# Patient Record
Sex: MALE | Race: OTHER | Employment: UNEMPLOYED | URBAN - METROPOLITAN AREA
[De-identification: names, ages, dates, MRNs, and addresses within clinical notes are randomized per-mention and may not be internally consistent; named-entity substitution may affect disease eponyms.]

---

## 2023-06-21 ENCOUNTER — HOSPITAL ENCOUNTER (OUTPATIENT)
Dept: PHYSICAL THERAPY | Age: 58
Setting detail: THERAPIES SERIES
End: 2023-06-21
Payer: MEDICARE

## 2023-06-28 ENCOUNTER — HOSPITAL ENCOUNTER (OUTPATIENT)
Dept: PHYSICAL THERAPY | Age: 58
Setting detail: THERAPIES SERIES
Discharge: HOME | End: 2023-06-28
Payer: MEDICARE

## 2023-06-28 PROCEDURE — 97162 PT EVAL MOD COMPLEX 30 MIN: CPT

## 2023-06-28 ASSESSMENT — PAIN DESCRIPTION - ORIENTATION: ORIENTATION: RIGHT

## 2023-06-28 ASSESSMENT — PAIN SCALES - GENERAL: PAINLEVEL_OUTOF10: 5

## 2023-06-28 ASSESSMENT — PAIN DESCRIPTION - LOCATION: LOCATION: LEG

## 2023-07-07 ENCOUNTER — HOSPITAL ENCOUNTER (OUTPATIENT)
Dept: PHYSICAL THERAPY | Age: 58
Setting detail: THERAPIES SERIES
Discharge: HOME | End: 2023-07-07
Payer: MEDICARE

## 2023-07-07 PROCEDURE — 97110 THERAPEUTIC EXERCISES: CPT

## 2023-07-07 ASSESSMENT — PAIN SCALES - GENERAL: PAINLEVEL_OUTOF10: 4

## 2023-07-07 ASSESSMENT — PAIN DESCRIPTION - DESCRIPTORS: DESCRIPTORS: DULL;ACHING;THROBBING

## 2023-07-07 ASSESSMENT — PAIN DESCRIPTION - ORIENTATION: ORIENTATION: RIGHT

## 2023-07-07 ASSESSMENT — PAIN DESCRIPTION - LOCATION: LOCATION: LEG

## 2023-07-07 NOTE — PROGRESS NOTES
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, right femur, initial encounter for fracture [M84.551A]  Other specified personal risk factors, not elsewhere classified [Z91.89]      Treatment Diagnosis: Rt leg pain    Visit Information:  Insurance: Payor: MEDICARE / Plan: MEDICARE PART A AND B / Product Type: *No Product type* /   PT Visit Information  PT Insurance Information: Medicare  Total # of Visits to Date: 2  Plan of Care/Certification Expiration Date: 23  No Show: 0  Progress Note Due Date: 23  Canceled Appointment: 0  Progress Note Counter:     Subjective Information:  Subjective: Pt reported he has been completing his HEP and has noticed a difference in pain levels. Pt noted stairs are still difficult for him to complete. HEP Compliance:  [x] Good [] Fair [] Poor [] Reports not doing due to:               Pain Screening  Patient Currently in Pain: Yes  Pain Assessment: 0-10  Pain Level: 4  Pain Location: Leg  Pain Orientation: Right  Pain Descriptors: Dull, Aching, Throbbing    Treatment:  Exercises:  Exercises  Exercise 1: Bike L1 x5 min  Exercise 2: Heel slides with strap 3\" hold x15  Exercise 3: QS 5\" hold x15  Exercise 4: SAQs 3\" hold x 15  Exercise 5: 3 way SLR x10  Exercise 6: Bridges x10  Exercise 7: Step ups 6\" F/L x10  Exercise 8: Rockerboard 3-way x 15  Exercise 20: HEP: QS, hams str + heel slides         *Indicates exercise, modality, or manual techniques to be initiated when appropriate      Assessment:    Body Structures, Functions, Activity Limitations Requiring Skilled Therapeutic Intervention: Decreased functional mobility , Decreased ROM, Decreased strength, Decreased balance, Increased

## 2023-07-12 ENCOUNTER — HOSPITAL ENCOUNTER (OUTPATIENT)
Dept: PHYSICAL THERAPY | Age: 58
Setting detail: THERAPIES SERIES
Discharge: HOME | End: 2023-07-12
Payer: MEDICARE

## 2023-07-12 PROCEDURE — 97110 THERAPEUTIC EXERCISES: CPT

## 2023-07-12 ASSESSMENT — PAIN DESCRIPTION - ORIENTATION: ORIENTATION: RIGHT

## 2023-07-12 ASSESSMENT — PAIN SCALES - GENERAL: PAINLEVEL_OUTOF10: 4

## 2023-07-12 ASSESSMENT — PAIN DESCRIPTION - DESCRIPTORS: DESCRIPTORS: ACHING;SORE

## 2023-07-12 ASSESSMENT — PAIN DESCRIPTION - LOCATION: LOCATION: LEG

## 2023-07-12 NOTE — PROGRESS NOTES
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 Reports completing HEP, states increased soreness after doing exercises. reports he would like to focus more on knee vs hip.   HEP Compliance:  [x] Good [] Fair [] Poor [] Reports not doing due to:    Pain Screening  Patient Currently in Pain: Yes  Pain Level: 4  Pain Location: Leg  Pain Orientation: Right  Pain Descriptors: Aching, Sore    Treatment:  Exercises:  Exercises  Exercise 1: Bike Seat L1 x5 min  Exercise 2: Heel slides with strap 20 reps x 5\" holds  Exercise 3: QS foot on bolster 15 reps x 5\" holds  Exercise 7: Step ups 6\" F/L/R x15, Reps  Exercise 9: hamstring 5 reps x 20\" holds on step  Exercise 10: LAQ 15 reps x 5\" holds x 2# ankle  Exercise 11: prone hamstring curl 15 reps x 2# ankle  Exercise 12: lunge at step 15 reps x 5\" holds  Exercise 20: HEP: continue current + step-ups, LAQ, hamstring curl (administered RTB for home), lunge     *Indicates exercise, modality, or manual techniques to be initiated when appropriate    Objective Measures:     LTG 1 Current Status[de-identified] : -3

## 2023-07-21 ENCOUNTER — HOSPITAL ENCOUNTER (OUTPATIENT)
Dept: PHYSICAL THERAPY | Age: 58
Setting detail: THERAPIES SERIES
Discharge: HOME | End: 2023-07-21
Payer: MEDICARE

## 2023-07-21 PROCEDURE — 97110 THERAPEUTIC EXERCISES: CPT

## 2023-07-21 ASSESSMENT — PAIN DESCRIPTION - ORIENTATION: ORIENTATION: RIGHT

## 2023-07-21 ASSESSMENT — PAIN SCALES - GENERAL: PAINLEVEL_OUTOF10: 5

## 2023-07-21 ASSESSMENT — PAIN DESCRIPTION - DESCRIPTORS: DESCRIPTORS: ACHING;SORE

## 2023-07-21 ASSESSMENT — PAIN DESCRIPTION - LOCATION: LOCATION: LEG

## 2023-07-21 NOTE — PROGRESS NOTES
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, right femur, initial encounter for fracture [M84.551A]  Other specified personal risk factors, not elsewhere classified [Z91.89]      Treatment Diagnosis: Rt leg pain    Visit Information:  Insurance: Payor: MEDICARE / Plan: MEDICARE PART A AND B / Product Type: *No Product type* /   PT Visit Information  PT Insurance Information: Medicare  Total # of Visits to Date: 4  Plan of Care/Certification Expiration Date: 23  No Show: 0  Progress Note Due Date: 23  Canceled Appointment: 0  Progress Note Counter:     Subjective Information:  Subjective: Pt arrived 10 minutes late, however able to accomodate. Pt reported the soreness after exercises has decreased. Pt noted down the steps is difficult and painful.   HEP Compliance:  [x] Good [] Fair [] Poor [] Reports not doing due to:               Pain Screening  Patient Currently in Pain: Yes  Pain Assessment: 0-10  Pain Level: 5  Pain Location: Leg  Pain Orientation: Right  Pain Descriptors: Aching, Sore    Treatment:  Exercises:  Exercises  Exercise 1: Bike Seat L1 x 6 min  Exercise 2: Heel slides with strap 20 reps x 5\" holds  Exercise 3: QS foot on bolster 15 reps x 5\" holds  Exercise 7: Step ups 6\" F/L/R x15, Reps  Exercise 9: hamstring 5 reps x 20\" holds on step  Exercise 10: LAQ 15 reps x 5\" holds x 2# ankle  Exercise 11: prone hamstring curl 15 reps x 2# ankle  Exercise 12: lunge at step 15 reps x 5\" holds  Exercise 20: HEP: continue current + step-ups, LAQ, hamstring curl (administered RTB for home), lunge         *Indicates exercise, modality, or manual techniques to be initiated when appropriate    Objective Measures:

## 2023-07-25 ENCOUNTER — HOSPITAL ENCOUNTER (OUTPATIENT)
Dept: PHYSICAL THERAPY | Age: 58
Setting detail: THERAPIES SERIES
Discharge: HOME | End: 2023-07-25
Payer: MEDICARE

## 2023-07-25 PROCEDURE — 97110 THERAPEUTIC EXERCISES: CPT

## 2023-07-25 ASSESSMENT — PAIN DESCRIPTION - ORIENTATION: ORIENTATION: RIGHT

## 2023-07-25 ASSESSMENT — PAIN SCALES - GENERAL: PAINLEVEL_OUTOF10: 4

## 2023-07-25 ASSESSMENT — PAIN DESCRIPTION - LOCATION: LOCATION: KNEE

## 2023-07-25 NOTE — PLAN OF CARE
[id number] [facility]     Ph: [phone]  Fax: [phone]      [] Certification  [] Recertification []  Plan of Care  [x] Progress Note [] Discharge      Referring Provider: MELCHOR Sandhu - CNP     From:  Keli Pacheco PT  Patient: Savanna Simms (89 y.o. 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 Independent and ongoing In progress, Partially met     Long Term Goals - Time Frame for Long Term Goals : 6 weeks  Goals Current/ Discharge status Status   Long Term Goal 1: Improve Rt knee flexion by >/= 5-10 deg to increase ease with ambulation. AROM RLE (degrees)  R Knee Flexion (0-145): 130  R Knee Extension (0): 1    In progress, Partially met   Long Term Goal 2: Pt will ambulate throughout department with good foot clearance with improved Rt LE WBing and knee flexion S/I. Antalgic gait pattern throughout clinic In progress   Long Term Goal 3: Improve sabra LE strength to >/= 4+/5 to improve stability with standing and walking.  Strength LLE  L Hip Flexion: 5/5  L Hip Extension: 4+/5  L Hip ABduction: 4+/5  L Knee Flexion: 5/5  L Knee Extension: 5/5  L Ankle Dorsiflexion: 5/5  Strength RLE  R Hip Flexion: 4+/5  R Hip Extension: 4+/5  R Hip ABduction: 4+/5  R Knee Flexion: 5/5  R Knee Extension: 5/5  R Ankle

## 2023-07-25 NOTE — PROGRESS NOTES
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 Pt reports falling on Rt knee and Lt leg slipped BWD.   HEP Compliance:  [x] Good [] Fair [] Poor [] Reports not doing due to:    Pain Screening  Patient Currently in Pain: Yes  Pain Assessment: 0-10  Pain Level: 4  Pain Location: Knee  Pain Orientation: Right    Treatment:  Exercises:  Exercises  Exercise 1: Bike Seat L1 x 6 min  Exercise 4: Squats x10  Exercise 5: Gait drills:  with 3 sec hold  Exercise 7: Step downs 4\" x10 sabra  Exercise 9: hamstring str reps x 20\" holds on step  Exercise 12: lunge at step 15 reps x 5\" holds  Exercise 20: HEP: squats, can try step downs but instructed not to complete on a full step     Objective Measures:     Strength: [] NT  [x] MMT completed:  Strength RLE  R Hip Flexion: 4+/5  R Hip Extension: 4+/5  R Hip ABduction: 4+/5  R Knee Flexion: 5/5  R Knee Extension: 5/5  R Ankle Dorsiflexion: 5/5  Strength LLE  L Hip Flexion: 5/5  L Hip Extension: 4+/5  L Hip ABduction: 4+/5  L Knee Flexion: 5/5  L Knee Extension: 5/5  L Ankle Dorsiflexion:

## 2023-09-06 ENCOUNTER — HOSPITAL ENCOUNTER (OUTPATIENT)
Dept: PHYSICAL THERAPY | Age: 58
Setting detail: THERAPIES SERIES
Discharge: HOME | End: 2023-09-06
Payer: MEDICARE

## 2023-09-06 PROCEDURE — 97110 THERAPEUTIC EXERCISES: CPT

## 2023-09-06 ASSESSMENT — PAIN DESCRIPTION - DESCRIPTORS: DESCRIPTORS: DULL

## 2023-09-06 ASSESSMENT — PAIN DESCRIPTION - ORIENTATION: ORIENTATION: RIGHT

## 2023-09-06 ASSESSMENT — PAIN SCALES - GENERAL: PAINLEVEL_OUTOF10: 5

## 2023-09-06 ASSESSMENT — PAIN DESCRIPTION - LOCATION: LOCATION: KNEE

## 2023-09-06 NOTE — PROGRESS NOTES
[id number] [facility]     Ph: [phone]  Fax: [phone]    [] Certification  [] Recertification []  Plan of Care  [x] Progress Note [] Discharge      Referring Provider: MELCHOR Rowe CNP    From:  Waleska Granado DPT     Patient: Leticia Rodriguez (66 y.o. 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 STG 1 Current Status[de-identified] 23: Non compliant last month. Not Met     Long Term Goals - Time Frame for Long Term Goals : 6 weeks  Goals Current/ Discharge status Status   Long Term Goal 1: Improve Rt knee flexion by >/= 5-10 deg to increase ease with ambulation. LTG 1 Current Status[de-identified] 23: Right knee 2-121 degrees supine. Slight antalgic gait. Slightly better than at evaluation. Met   Long Term Goal 2: Pt will ambulate throughout department with good foot clearance with improved Rt LE WBing and knee flexion S/I. LTG 2 Current Status[de-identified] 23: Pt displayed decreased foot clearance, knee flexion, and heel strike with a slight antalgic gait. Slightly better than at evaluation. Partially met   Long Term Goal 3: Improve sabra LE strength to >/= 4+/5 to improve stability with standing and walking. LTG 3 Current Status[de-identified] 23: RLE hip 4+/5 except ext. 4-4+/5, knee & DF 5/5.  LLE 5/5 grossly except Hip ext & abd
S/I. Partially met   LTG 3 Improve sabra LE strength to >/= 4+/5 to improve stability with standing and walking. Partially met   LTG 4 LEFS >/= 20/80 to improve pt's QOL. Met          Plan:  Frequency/Duration:  Plan  Plan Frequency: 2  Plan weeks: 6  Current Treatment Recommendations: Strengthening, ROM, Balance training, Functional mobility training, Transfer training, Gait training, Stair training, Neuromuscular re-education, Manual, Home exercise program, Safety education & training, Patient/Caregiver education & training, Equipment evaluation, education, & procurement, Modalities  Modalities: Heat/Cold  Additional Comments: Placed on hold for RTD. Pt to continue current HEP. See objective section for any therapeutic exercise changes, additions or modifications this date.     Therapy Time:      PT Individual Minutes  Time In: 9379  Time Out: 4204  Minutes: 29  Timed Code Treatment Minutes: 29 Minutes  Procedure Minutes:0    Timed Activity Minutes Units   Ther Ex 29 2     Electronically signed by Marjorie Thornton PTA on 9/6/23 at 11:27 AM EDT    Electronically signed by Princess Finney PT on 9/6/2023 at 4:03 PM

## 2023-09-11 ENCOUNTER — APPOINTMENT (OUTPATIENT)
Dept: PHYSICAL THERAPY | Age: 58
End: 2023-09-11
Payer: MEDICARE

## 2024-08-07 ENCOUNTER — APPOINTMENT (OUTPATIENT)
Dept: OTOLARYNGOLOGY | Facility: CLINIC | Age: 59
End: 2024-08-07
Payer: COMMERCIAL

## 2024-08-07 VITALS — HEIGHT: 69 IN | WEIGHT: 200 LBS | BODY MASS INDEX: 29.62 KG/M2

## 2024-08-07 DIAGNOSIS — H92.13 OTORRHEA OF BOTH EARS: Primary | ICD-10-CM

## 2024-08-07 PROCEDURE — 99203 OFFICE O/P NEW LOW 30 MIN: CPT | Performed by: OTOLARYNGOLOGY

## 2024-08-07 PROCEDURE — 3008F BODY MASS INDEX DOCD: CPT | Performed by: OTOLARYNGOLOGY

## 2024-08-07 RX ORDER — CEFDINIR 300 MG/1
300 CAPSULE ORAL 2 TIMES DAILY
Qty: 20 CAPSULE | Refills: 0 | Status: SHIPPED | OUTPATIENT
Start: 2024-08-07 | End: 2024-08-17

## 2024-08-07 RX ORDER — CIPROFLOXACIN AND DEXAMETHASONE 3; 1 MG/ML; MG/ML
4 SUSPENSION/ DROPS AURICULAR (OTIC) 2 TIMES DAILY
Qty: 7.5 ML | Refills: 0 | Status: SHIPPED | OUTPATIENT
Start: 2024-08-07 | End: 2024-08-14

## 2024-08-07 ASSESSMENT — ENCOUNTER SYMPTOMS
NEUROLOGICAL NEGATIVE: 1
CARDIOVASCULAR NEGATIVE: 1
CONSTITUTIONAL NEGATIVE: 1
RESPIRATORY NEGATIVE: 1

## 2024-08-07 NOTE — PROGRESS NOTES
Subjective   Patient ID: Noel Reyes is a 59 y.o. male who presents for Ear Tube Check.    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  This is part of a multiple myeloma process being treated at [facility].  Tubes were placed there as well.  Patient has noted significant otorrhea with blockage.  He has been on TobraDex drops without improvement.  He has not been on systemic antibiotics.  All remaining ENT inquiry clear.  No worrisome vertigo.    Review of Systems   Constitutional: Negative.    HENT: Negative.     Respiratory: Negative.     Cardiovascular: Negative.    Neurological: Negative.      Physical Exam  General appearance: No acute distress. Normal facies. Symmetric facial movement. No gross lesions of the face are noted.  Bilateral ear exam noted for otorrhea via intact functional tubes.  Extensive debridement performed.  Nasal exam is clear anteriorly. The septum is relatively straight and the nasal mucosa is grossly unremarkable without evidence of any worrisome infection or polyp or mass. The oral cavity and oropharynx are unremarkable. There is no evidence of any gross lesion or mucosal irregularity throughout the structures. The neck is negative for mass or lymphadenopathy. The trachea and parotid region are clear free of obvious mass or tumor. Facial structures are grossly otherwise unremarkable as well.      Assessment/Plan     Bilateral likely chronic otorrhea.  He had radiation to the skull for extra medullary plasmacytoma.  We will start him on Ciprodex and Omnicef and I will see him back for recheck in 3 weeks.  If we continue to have this much issue with drainage [facility] our dedicated otology colleagues for sure.

## 2024-08-28 ENCOUNTER — APPOINTMENT (OUTPATIENT)
Dept: OTOLARYNGOLOGY | Facility: CLINIC | Age: 59
End: 2024-08-28
Payer: COMMERCIAL

## 2024-08-28 DIAGNOSIS — H92.13 OTORRHEA OF BOTH EARS: Primary | ICD-10-CM

## 2024-08-28 PROCEDURE — 1036F TOBACCO NON-USER: CPT | Performed by: OTOLARYNGOLOGY

## 2024-08-28 PROCEDURE — 99213 OFFICE O/P EST LOW 20 MIN: CPT | Performed by: OTOLARYNGOLOGY

## 2024-08-28 NOTE — PROGRESS NOTES
The patient returns.  Was seeing him back today follow-up check chronic otorrhea.  Doing much better.  He has had previous radiation for plasmacytoma.  He has utilize drops with significant movement.  Denies any other worrisome ENT issues.  There have been no significant changes in past medical or past surgical histories except as mentioned.    Exam:  No acute distress.  Bilateral ear tubes intact clean and dry today.  The right 1 is noted for minimal moisture.  Nasal exam is clear anteriorly. The septum is relatively straight and the nasal mucosa is grossly unremarkable without evidence of any worrisome infection or polyp or mass. The oral cavity and oropharynx are unremarkable. There is no evidence of any gross lesion or mucosal irregularity throughout the structures. The neck is negative for mass or lymphadenopathy. The trachea and parotid region are clear free of obvious mass or tumor. Facial structures are grossly otherwise unremarkable as well.    Assessment and plan:  History of bilateral chronic otorrhea with intact functional tubes now with significant improvement status post aggressive management with topical drops etc.  Currently may finish out the drops and then several days and follow-up with will see me in 6 months.  All questions were answered in this regard accordingly.

## 2024-09-27 ENCOUNTER — CLINICAL SUPPORT (OUTPATIENT)
Dept: AUDIOLOGY | Facility: CLINIC | Age: 59
End: 2024-09-27
Payer: COMMERCIAL

## 2024-09-27 DIAGNOSIS — H92.13 OTORRHEA OF BOTH EARS: Primary | ICD-10-CM

## 2024-09-27 NOTE — PROGRESS NOTES
Mr. Reyes, age 59, returned today to  his custom swim plugs.    Procedure:  Dispensed patient's custom ear plugs.  They were noted to fit securely and comfortably in his ears in office.  He reported satisfaction and will return as needed.

## 2024-09-30 ENCOUNTER — OFFICE VISIT (OUTPATIENT)
Dept: OTOLARYNGOLOGY | Facility: CLINIC | Age: 59
End: 2024-09-30
Payer: COMMERCIAL

## 2024-09-30 DIAGNOSIS — T16.2XXA FOREIGN BODY OF LEFT EAR, INITIAL ENCOUNTER: Primary | ICD-10-CM

## 2024-09-30 PROCEDURE — 69200 CLEAR OUTER EAR CANAL: CPT | Performed by: OTOLARYNGOLOGY

## 2024-09-30 NOTE — PROGRESS NOTES
Patient returns.  Seen today as an add-on for significant amount of silicone utilized for ceiling ear canal with an intact tube.  Unfortunately was not able to get all this out at the hearing aid specialist office.  Not being seen for management of same.  Remaining head neck inquiry clear.    Procedure:  Under the operating microscope, patient had all silicone material removed in a diligent manner.  This is quite painstaking as silicone at body temperature is somewhat soft and mushy and almost liquefied to some degree.  It does not however go up into a suction easily.  Nonetheless it was completely removed and the ears otherwise normal following same.  Patient tolerated well.  No bleeding.  No complication.    Assessment and plan: Removal of foreign body left ear canal as noted.  Follow as needed in that regard.  The patient was taken to the operating room and administered general anesthesia. Following appropriate huddle, prep and drape, and final timeout, the patient had the right ear addressed under the microscope. It was cleaned of all cerumen. An inferior myringotomy was made with placement of a grommet type tube. The contralateral left ear was then cleaned of all cerumen with an inferior myringotomy made with placement of a tube. The patient had any effusion suctioned free and antibiotic drops were applied as necessary. The MacGyver mouth gag was then brought into position and suspended from the [place] stand. The patient had a red rubber catheter placed to retract the soft palate. The adenoids were visualized and ablated with suction cautery in a safe manner with significant improvement in the posterior nasopharyngeal airway. The nasal cavity and throat were irrigated with saline and after 5 minutes of observation there was no gross bleeding. The patient was subsequently released from anesthesia and taken to the recovery room in stable condition. Estimated blood loss minimal. No complications.

## 2024-12-06 ENCOUNTER — APPOINTMENT (OUTPATIENT)
Dept: OTOLARYNGOLOGY | Facility: CLINIC | Age: 59
End: 2024-12-06
Payer: COMMERCIAL

## 2025-02-10 ENCOUNTER — CLINICAL SUPPORT (OUTPATIENT)
Dept: AUDIOLOGY | Facility: CLINIC | Age: 60
End: 2025-02-10
Payer: COMMERCIAL

## 2025-02-10 ENCOUNTER — APPOINTMENT (OUTPATIENT)
Dept: OTOLARYNGOLOGY | Facility: CLINIC | Age: 60
End: 2025-02-10
Payer: COMMERCIAL

## 2025-02-10 DIAGNOSIS — H72.91 PERFORATION OF RIGHT TYMPANIC MEMBRANE: Primary | ICD-10-CM

## 2025-02-10 DIAGNOSIS — H90.3 BILATERAL SENSORINEURAL HEARING LOSS: ICD-10-CM

## 2025-02-10 DIAGNOSIS — H90.3 BILATERAL SENSORINEURAL HEARING LOSS: Primary | ICD-10-CM

## 2025-02-10 PROCEDURE — 92567 TYMPANOMETRY: CPT | Performed by: AUDIOLOGIST

## 2025-02-10 PROCEDURE — 92557 COMPREHENSIVE HEARING TEST: CPT | Performed by: AUDIOLOGIST

## 2025-02-10 PROCEDURE — 99213 OFFICE O/P EST LOW 20 MIN: CPT | Performed by: OTOLARYNGOLOGY

## 2025-02-10 NOTE — PROGRESS NOTES
Mr. Reyes, age 60, was seen today for a hearing evaluation during his follow up with ENT, Dr. Jaime.  He has a history of PE tubes arriving today reporting concern for a plugged sensation in his left ear.    Results:  Otoscopy revealed clear ear canals  with left PE tube and right tympanic membrane perforation visualized.  Tympanometry revealed flat, Type B tympanograms with large ear canal volumes bilaterally, consistent with patent left PE tube and right tympanic membrane perforation.  Audiometric thresholds revealed a mild hearing loss 250-500 Hz rising to normal hearing sensitivity through 2000 Hz sloping back to a mild high frequency sensorineural hearing loss in his left ear and revealed a mild hearing loss 250-1000 Hz rising to normal hearing sensitivity through 2000 Hz sloping back to a moderate high frequency sensorineural hearing loss in his right ear.  Word recognition scores were excellent bilaterally.    Recommendations:  Follow-up with PCP, Dr. Rodriguez, as medically directed.  Follow-up with ENT, Dr. Jaime, as medically directed.  Retest hearing as directed in conjunction with otologic management or annually to monitor.

## 2025-02-10 NOTE — PROGRESS NOTES
Patient returns.  Was seen today for follow-up check on his ears.  He has noted some evident blockage in the ears.  Does have a prior history of bilateral tubes.  He does not know if the tubes are still present.  He denies any pain.  No significant drainage.  All remaining ENT inquiry is otherwise unremarkable.  There have been no significant changes in past medical or past surgical histories except as mentioned.    Exam:  No acute distress.  Right ear noted for a stable clean dry perforation at the site of the previous tube otherwise unremarkable.  Left ear noted for an intact clean dry tube.  Nasal exam is clear anteriorly. The septum is relatively straight and the nasal mucosa is grossly unremarkable without evidence of any worrisome infection or polyp or mass. The oral cavity and oropharynx are unremarkable. There is no evidence of any gross lesion or mucosal irregularity throughout the structures. The neck is negative for mass or lymphadenopathy. The trachea and parotid region are clear free of obvious mass or tumor. Facial structures are grossly otherwise unremarkable as well.    Audiogram:  Bilateral fairly symmetric sensorineural hearing loss slightly worse in the right ear at frequencies above 4000 Hz    Assessment and plan:  History of bilateral sensorineural hearing loss with prior history of myringotomy with tubes for effusion etc.  Thankfully he has a residual perforation on the right.  He has an intact tube on the left.  Will see him back for recheck in 6 to 12 months, sooner with issue.  Reassurance otherwise provided.  All questions were answered in this regard accordingly.

## 2025-02-21 ENCOUNTER — TELEPHONE (OUTPATIENT)
Dept: PHYSICAL THERAPY | Facility: HOSPITAL | Age: 60
End: 2025-02-21
Payer: COMMERCIAL

## 2025-02-21 NOTE — TELEPHONE ENCOUNTER
Patient called needing physical therapy. Patient stated the script that was given said for massage of his lower leg (lymph). I told he we do not do that here. I sent him to [place]. After reaching out to patient twice I LVM with him to see if I can get his referral.

## 2025-05-23 ENCOUNTER — APPOINTMENT (OUTPATIENT)
Dept: OTOLARYNGOLOGY | Facility: CLINIC | Age: 60
End: 2025-05-23
Payer: COMMERCIAL